# Patient Record
(demographics unavailable — no encounter records)

---

## 2025-03-26 NOTE — PHYSICAL EXAM
[Well-appearing] : well-appearing [No deformities] : no deformities [Alert] : alert [Well related, good eye contact] : well related, good eye contact [Conversant] : conversant [Normal speech and language] : normal speech and language [Follows instructions well] : follows instructions well [VFF] : VFF [Pupils reactive to light and accommodation] : pupils reactive to light and accommodation [Full extraocular movements] : full extraocular movements [Saccadic and smooth pursuits intact] : saccadic and smooth pursuits intact [No nystagmus] : no nystagmus [Normal facial sensation to light touch] : normal facial sensation to light touch [No facial asymmetry or weakness] : no facial asymmetry or weakness [Gross hearing intact] : gross hearing intact [Equal palate elevation] : equal palate elevation [Good shoulder shrug] : good shoulder shrug [Normal tongue movement] : normal tongue movement [Midline tongue, no fasciculations] : midline tongue, no fasciculations [Normal axial and appendicular muscle tone] : normal axial and appendicular muscle tone [Gets up on table without difficulty] : gets up on table without difficulty [No pronator drift] : no pronator drift [Normal finger tapping and fine finger movements] : normal finger tapping and fine finger movements [No abnormal involuntary movements] : no abnormal involuntary movements [5/5 strength in proximal and distal muscles of arms and legs] : 5/5 strength in proximal and distal muscles of arms and legs [Walks and runs well] : walks and runs well [Able to walk on heels] : able to walk on heels [Able to walk on toes] : able to walk on toes [2+ biceps] : 2+ biceps [Triceps] : triceps [Knee jerks] : knee jerks [No ankle clonus] : no ankle clonus [Ankle jerks] : ankle jerks [Localizes LT and temperature] : localizes LT and temperature [No dysmetria on FTNT] : no dysmetria on FTNT [Good walking balance] : good walking balance [Normal gait] : normal gait [Able to tandem well] : able to tandem well [Negative Romberg] : negative Romberg [No papilledema] : no papilledema

## 2025-03-26 NOTE — HISTORY OF PRESENT ILLNESS
[FreeTextEntry1] : 10 y/o M presenting for initial evaluation of headaches.   Headaches started 8/2024 with episodes of headaches that waxed and waned since then, used to be 1x/week and now 3x/week. Periods of illnesses (e.g. COVID, influenza) worsened headaches throughout the winter, resulting in 15-20 days of school (related to illness, not headache).   Headache semiology: dull, throbbing, R temple and forehead regions, non-radiating.  Associated Features: nausea/vomiting (has happened in school and in the mornings), phonophobia Frequency and Duration: 3x/week, typically in the mornings that resolve in the afternoons Exacerbating Symptoms: noises Alleviating Symptoms: medication Medication use: Advil 15mL 1x/q2d Response to medication: resolves pain Sleep History: 10PM - 7AM, wakes up in the middle of the night with headache, no emesis Social History: lives with parents and sibling, safe at home and neighborhood. Currently 7th grader at [employer], does well in school. No bullying. Denies any illicit drug use, smoking/vaping, alcohol use. Denies any SI/A or HI/A.  Birth Hx: ex-32wk via C/S; NICU stay for feeding/growing. No ST/PT/OT. Family Hx: +gluten insensitivity (mother)

## 2025-03-26 NOTE — PLAN
[FreeTextEntry1] : - Review of exacerbating factors (sleep hygiene, meal intake, food triggers) as well as alleviating measures (e.g. sleep, NSAID usage, light aerobic exercise) were reviewed.  - Recommended keeping headache diary to further monitor headache frequency, intensity, alleviating and exacerbating factors. - Prophylactic measures: Magnesium 400mg qd - Abortive measures: Naproxen 250mg BID PRN, no more than 4-5x/week - MR head w/w/o contrast to rule out intracranial pathology - Return to clinic in 2-3 months

## 2025-03-26 NOTE — CONSULT LETTER
[Dear  ___] : Dear  [unfilled], [Consult Letter:] : I had the pleasure of evaluating your patient, [unfilled]. [( Thank you for referring [unfilled] for consultation for _____ )] : Thank you for referring [unfilled] for consultation for [unfilled] [Please see my note below.] : Please see my note below. [Consult Closing:] : Thank you very much for allowing me to participate in the care of this patient.  If you have any questions, please do not hesitate to contact me. [Sincerely,] : Sincerely, [FreeTextEntry3] : Dr. Hemant Carrero MD Attending Physician Pediatric Neurology and Epilepsy

## 2025-03-26 NOTE — ASSESSMENT
[FreeTextEntry1] : 10 y/o M presenting for initial evaluation of headaches. Neurologic examination non focal. Semiology consistent with likely migraine headaches, but given frequency that increased and nocturnal awakening with headaches, will pursue further neuroimaging at this time to rule out an intracranial process. RTC 2-3 months.